# Patient Record
Sex: MALE | Race: WHITE | NOT HISPANIC OR LATINO | URBAN - METROPOLITAN AREA
[De-identification: names, ages, dates, MRNs, and addresses within clinical notes are randomized per-mention and may not be internally consistent; named-entity substitution may affect disease eponyms.]

---

## 2017-05-04 ENCOUNTER — APPOINTMENT (RX ONLY)
Dept: URBAN - METROPOLITAN AREA CLINIC 343 | Facility: CLINIC | Age: 52
Setting detail: DERMATOLOGY
End: 2017-05-04

## 2017-05-04 DIAGNOSIS — D18.0 HEMANGIOMA: ICD-10-CM

## 2017-05-04 DIAGNOSIS — L82.1 OTHER SEBORRHEIC KERATOSIS: ICD-10-CM

## 2017-05-04 DIAGNOSIS — D22 MELANOCYTIC NEVI: ICD-10-CM

## 2017-05-04 DIAGNOSIS — L91.8 OTHER HYPERTROPHIC DISORDERS OF THE SKIN: ICD-10-CM

## 2017-05-04 PROBLEM — D22.4 MELANOCYTIC NEVI OF SCALP AND NECK: Status: ACTIVE | Noted: 2017-05-04

## 2017-05-04 PROBLEM — J45.909 UNSPECIFIED ASTHMA, UNCOMPLICATED: Status: ACTIVE | Noted: 2017-05-04

## 2017-05-04 PROBLEM — D18.01 HEMANGIOMA OF SKIN AND SUBCUTANEOUS TISSUE: Status: ACTIVE | Noted: 2017-05-04

## 2017-05-04 PROCEDURE — ? COUNSELING

## 2017-05-04 PROCEDURE — ? SKIN TAG REMOVAL (COSMETIC)

## 2017-05-04 PROCEDURE — 99202 OFFICE O/P NEW SF 15 MIN: CPT

## 2017-05-04 ASSESSMENT — LOCATION SIMPLE DESCRIPTION DERM
LOCATION SIMPLE: CHEST
LOCATION SIMPLE: SCALP
LOCATION SIMPLE: RIGHT UPPER BACK
LOCATION SIMPLE: LEFT LATERAL CANTHUS
LOCATION SIMPLE: LEFT UPPER BACK

## 2017-05-04 ASSESSMENT — LOCATION DETAILED DESCRIPTION DERM
LOCATION DETAILED: RIGHT MEDIAL UPPER BACK
LOCATION DETAILED: RIGHT CENTRAL PARIETAL SCALP
LOCATION DETAILED: LEFT INFERIOR MEDIAL UPPER BACK
LOCATION DETAILED: STERNUM
LOCATION DETAILED: LEFT LATERAL CANTHUS

## 2017-05-04 ASSESSMENT — LOCATION ZONE DERM
LOCATION ZONE: EYELID
LOCATION ZONE: TRUNK
LOCATION ZONE: SCALP

## 2017-05-04 NOTE — PROCEDURE: SKIN TAG REMOVAL (COSMETIC)
Consent: Written consent obtained and the risks of skin tag removal was reviewed with the patient including but not limited to bleeding, pigmentary change, infection, pain, and remote possibility of scarring.
Removed With: liquid nitrogen
Price (Use Numbers Only, No Special Characters Or $): 25
Detail Level: Detailed